# Patient Record
Sex: FEMALE | Race: WHITE | NOT HISPANIC OR LATINO | Employment: FULL TIME | URBAN - METROPOLITAN AREA
[De-identification: names, ages, dates, MRNs, and addresses within clinical notes are randomized per-mention and may not be internally consistent; named-entity substitution may affect disease eponyms.]

---

## 2022-09-06 ENCOUNTER — APPOINTMENT (OUTPATIENT)
Dept: MRI IMAGING | Facility: CLINIC | Age: 57
End: 2022-09-06
Attending: EMERGENCY MEDICINE
Payer: COMMERCIAL

## 2022-09-06 ENCOUNTER — HOSPITAL ENCOUNTER (EMERGENCY)
Facility: CLINIC | Age: 57
Discharge: HOME | End: 2022-09-06
Attending: EMERGENCY MEDICINE | Admitting: EMERGENCY MEDICINE
Payer: COMMERCIAL

## 2022-09-06 VITALS
OXYGEN SATURATION: 97 % | SYSTOLIC BLOOD PRESSURE: 141 MMHG | DIASTOLIC BLOOD PRESSURE: 96 MMHG | HEART RATE: 51 BPM | RESPIRATION RATE: 15 BRPM | TEMPERATURE: 98 F

## 2022-09-06 DIAGNOSIS — R51.9 NONINTRACTABLE HEADACHE, UNSPECIFIED CHRONICITY PATTERN, UNSPECIFIED HEADACHE TYPE: ICD-10-CM

## 2022-09-06 DIAGNOSIS — R42 DIZZINESS: ICD-10-CM

## 2022-09-06 LAB
ALBUMIN SERPL-MCNC: 4.1 G/DL (ref 3.4–5)
ALP SERPL-CCNC: 89 U/L (ref 40–150)
ALT SERPL W P-5'-P-CCNC: 35 U/L (ref 0–50)
ANION GAP SERPL CALCULATED.3IONS-SCNC: 6 MMOL/L (ref 3–14)
AST SERPL W P-5'-P-CCNC: 29 U/L (ref 0–45)
BILIRUB SERPL-MCNC: 1.1 MG/DL (ref 0.2–1.3)
BUN SERPL-MCNC: 18 MG/DL (ref 7–30)
CALCIUM SERPL-MCNC: 9.5 MG/DL (ref 8.5–10.1)
CHLORIDE BLD-SCNC: 106 MMOL/L (ref 94–109)
CO2 SERPL-SCNC: 28 MMOL/L (ref 20–32)
CREAT SERPL-MCNC: 0.83 MG/DL (ref 0.52–1.04)
ERYTHROCYTE [DISTWIDTH] IN BLOOD BY AUTOMATED COUNT: 12.9 % (ref 10–15)
GFR SERPL CREATININE-BSD FRML MDRD: 82 ML/MIN/1.73M2
GLUCOSE BLD-MCNC: 114 MG/DL (ref 70–99)
HCT VFR BLD AUTO: 40.8 % (ref 35–47)
HGB BLD-MCNC: 13.6 G/DL (ref 11.7–15.7)
HOLD SPECIMEN: NORMAL
MCH RBC QN AUTO: 29.1 PG (ref 26.5–33)
MCHC RBC AUTO-ENTMCNC: 33.3 G/DL (ref 31.5–36.5)
MCV RBC AUTO: 87 FL (ref 78–100)
PLATELET # BLD AUTO: 192 10E3/UL (ref 150–450)
POTASSIUM BLD-SCNC: 3.9 MMOL/L (ref 3.4–5.3)
PROT SERPL-MCNC: 7.6 G/DL (ref 6.8–8.8)
RBC # BLD AUTO: 4.67 10E6/UL (ref 3.8–5.2)
SODIUM SERPL-SCNC: 140 MMOL/L (ref 133–144)
TROPONIN I SERPL HS-MCNC: <3 NG/L
WBC # BLD AUTO: 5.5 10E3/UL (ref 4–11)

## 2022-09-06 PROCEDURE — A9585 GADOBUTROL INJECTION: HCPCS | Performed by: EMERGENCY MEDICINE

## 2022-09-06 PROCEDURE — 85027 COMPLETE CBC AUTOMATED: CPT | Performed by: EMERGENCY MEDICINE

## 2022-09-06 PROCEDURE — 258N000003 HC RX IP 258 OP 636: Performed by: EMERGENCY MEDICINE

## 2022-09-06 PROCEDURE — 80053 COMPREHEN METABOLIC PANEL: CPT | Performed by: EMERGENCY MEDICINE

## 2022-09-06 PROCEDURE — 85018 HEMOGLOBIN: CPT | Performed by: EMERGENCY MEDICINE

## 2022-09-06 PROCEDURE — 36415 COLL VENOUS BLD VENIPUNCTURE: CPT | Performed by: EMERGENCY MEDICINE

## 2022-09-06 PROCEDURE — 84484 ASSAY OF TROPONIN QUANT: CPT | Performed by: EMERGENCY MEDICINE

## 2022-09-06 PROCEDURE — 96361 HYDRATE IV INFUSION ADD-ON: CPT

## 2022-09-06 PROCEDURE — 96360 HYDRATION IV INFUSION INIT: CPT

## 2022-09-06 PROCEDURE — 82310 ASSAY OF CALCIUM: CPT | Performed by: EMERGENCY MEDICINE

## 2022-09-06 PROCEDURE — 255N000002 HC RX 255 OP 636: Performed by: EMERGENCY MEDICINE

## 2022-09-06 PROCEDURE — 99285 EMERGENCY DEPT VISIT HI MDM: CPT | Mod: 25

## 2022-09-06 PROCEDURE — 70549 MR ANGIOGRAPH NECK W/O&W/DYE: CPT

## 2022-09-06 PROCEDURE — 70544 MR ANGIOGRAPHY HEAD W/O DYE: CPT

## 2022-09-06 PROCEDURE — 93005 ELECTROCARDIOGRAM TRACING: CPT

## 2022-09-06 PROCEDURE — 70553 MRI BRAIN STEM W/O & W/DYE: CPT

## 2022-09-06 RX ORDER — GADOBUTROL 604.72 MG/ML
10 INJECTION INTRAVENOUS ONCE
Status: COMPLETED | OUTPATIENT
Start: 2022-09-06 | End: 2022-09-06

## 2022-09-06 RX ADMIN — SODIUM CHLORIDE 1000 ML: 9 INJECTION, SOLUTION INTRAVENOUS at 12:53

## 2022-09-06 RX ADMIN — GADOBUTROL 10 ML: 604.72 INJECTION INTRAVENOUS at 19:42

## 2022-09-06 ASSESSMENT — ACTIVITIES OF DAILY LIVING (ADL)
ADLS_ACUITY_SCORE: 35
ADLS_ACUITY_SCORE: 35

## 2022-09-06 NOTE — ED TRIAGE NOTES
Sudden onset of lightheadedness, headache and nausea. Pt reports right calf pain for weeks, no swelling noted to calf. Denies chest pain or SOB. Gait WNL, balance WNL, no focal neuro deficits on triage exam. No slurred speech or facial droop.     Triage Assessment     Row Name 09/06/22 1242       Triage Assessment (Adult)    Airway WDL WDL       Respiratory WDL    Respiratory WDL WDL       Skin Circulation/Temperature WDL    Skin Circulation/Temperature WDL WDL       Cardiac WDL    Cardiac WDL WDL       Peripheral/Neurovascular WDL    Peripheral Neurovascular WDL WDL       Cognitive/Neuro/Behavioral WDL    Cognitive/Neuro/Behavioral WDL X    Level of Consciousness alert    Arousal Level opens eyes spontaneously    Orientation oriented x 4    Speech clear;spontaneous;logical    Mood/Behavior calm;cooperative       Pupils (CN II)    Pupil PERRLA yes    Pupil Size Left 3 mm    Pupil Size Right 3 mm       [place]in Coma Scale    Best Eye Response 4-->(E4) spontaneous    Best Motor Response 6-->(M6) obeys commands    Best Verbal Response 5-->(V5) oriented    [place]in Coma Scale Score 15

## 2022-09-06 NOTE — ED PROVIDER NOTES
History     Chief Complaint:    Headache and Dizziness       HPI   Rakel Ashby is a 57 year old female who presents with concern of having developed headache and feeling of vertigo today.  This came on fairly quickly today.  No recent fall or trauma or neck injury.  No recent chiropractic adjustments.  Had a similar episode about 2 weeks ago that resolved fully.  Denies vision change or slurred speech or any speech difficulty.  No numbness or tingling.  No weakness or paralysis symptoms of the face or extremities.  Felt a bit queasy without emesis.  Neck feels tight.  No history of cerebrovascular disease.  No history of stroke.  No chest pain.  No dyspnea.  Denies any black or bloody stool.  Does not feel faint.  No new urinary symptoms.  Does not use anticoagulants.  No new medications.  Noted a blood pressure was elevated to a maximum of 140s over 90s.  Is not treated for hypertension.  Is treated for dyslipidemia.  No other concerns.    Allergies:  No Clinical Screening - See Comments  No Known Drug Allergies     Medications:    atorvastatin (LIPITOR) 20 MG tablet      Past Medical History:    Past Medical History:   Diagnosis Date     Allergic rhinitis, cause unspecified      Back pain      BPV (benign positional vertigo)      Bradycardia      Chest pain      Dizzy      Ear pain      Fatigue      GERD (gastroesophageal reflux disease)      Headache      Hot flashes      Hyperlipidemia with target LDL less than 160      Insomnia      Menorrhagia      Muscle soreness      Myalgia and myositis, unspecified      Night sweats      Orthostatic hypotension      Rhinitis, allergic      Tingling      Unspecified disorder of skin and subcutaneous tissue      Warts        Patient Active Problem List    Diagnosis Date Noted     Hyperlipidemia LDL goal <160 08/20/2015     Priority: Medium     GERD (gastroesophageal reflux disease)      Priority: Medium     Hyperlipidemia with target LDL less than 160 03/17/2015      Priority: Medium        Past Surgical History:    No past surgical history on file.     Family History:    family history includes Cancer (age of onset: 76) in her mother; Cancer (age of onset: 83) in her maternal grandfather; Cancer - colorectal in her maternal grandfather and mother; Cerebrovascular Disease in an other family member; Coronary Artery Disease in her father; Depression in an other family member; Hyperlipidemia in an other family member; Hypertension in an other family member.    Social History:   reports that she has never smoked. She has never used smokeless tobacco. She reports current alcohol use. She reports that she does not use drugs.    PCP: Aiden Green     Review of Systems  All systems otherwise negative or per HPI    Physical Exam     Patient Vitals for the past 24 hrs:   BP Temp Pulse Resp SpO2   09/06/22 2026 (!) 141/96 -- 51 -- 97 %   09/06/22 1610 (!) 147/93 -- 57 -- 99 %   09/06/22 1245 -- 98  F (36.7  C) -- -- --   09/06/22 1241 (!) 146/85 -- 59 15 98 %      Physical Exam  SKIN:  Warm, dry.  HEMATOLOGIC/IMMUNOLOGIC/LYMPHATIC:  No pallor.  No lower limb edema.  HENT: Painless active range of motion of head and neck.  EYES:  Conjunctivae normal.  Normal extraocular motion.  Pupils equal round and reactive to light.  Visual fields intact.  No nystagmus.  CARDIOVASCULAR:  Regular rate and rhythm.  No murmur.  No carotid bruit.  RESPIRATORY:  No respiratory distress, breath sounds equal and normal.  GASTROINTESTINAL:  Soft, nontender abdomen.  MUSCULOSKELETAL: Normal body habitus.  Full normal active range of motion of the upper and lower limbs.  NEUROLOGIC:  Alert, conversant.  Oriented to self place and time.  No aphasia.  No dysarthria.  No tactile sensory or motor deficit of face or extremities.  No limb ataxia.  PSYCHIATRIC:  Normal mood.    Emergency Department Course     ECG (12:52:54):  Rate 55 bpm.   QT/QTc 456/436.   P-R-T axes 14  42  41.   Interpreted at 17:51 by Horacio Ybarra  MD James.     Imaging:    MRA Neck (Carotids) wo & w Contrast   Final Result   IMPRESSION:   HEAD MRI:    1.  Normal head MRI.      HEAD MRA:    1.  No vascular occlusion.   2.  Incidental small bilateral paraclinoid aneurysms.      NECK MRA:   1.  Normal neck MRA.      MRA Brain ([facility] [place]) wo Contrast   Final Result   IMPRESSION:   HEAD MRI:    1.  Normal head MRI.      HEAD MRA:    1.  No vascular occlusion.   2.  Incidental small bilateral paraclinoid aneurysms.      NECK MRA:   1.  Normal neck MRA.      MR Brain w/o & w Contrast   Final Result   IMPRESSION:   HEAD MRI:    1.  Normal head MRI.      HEAD MRA:    1.  No vascular occlusion.   2.  Incidental small bilateral paraclinoid aneurysms.      NECK MRA:   1.  Normal neck MRA.           Laboratory:    Labs Ordered and Resulted from Time of ED Arrival to Time of ED Departure   COMPREHENSIVE METABOLIC PANEL - Abnormal       Result Value    Sodium 140      Potassium 3.9      Chloride 106      Carbon Dioxide (CO2) 28      Anion Gap 6      Urea Nitrogen 18      Creatinine 0.83      Calcium 9.5      Glucose 114 (*)     Alkaline Phosphatase 89      AST 29      ALT 35      Protein Total 7.6      Albumin 4.1      Bilirubin Total 1.1      GFR Estimate 82     CBC WITH PLATELETS - Normal    WBC Count 5.5      RBC Count 4.67      Hemoglobin 13.6      Hematocrit 40.8      MCV 87      MCH 29.1      MCHC 33.3      RDW 12.9      Platelet Count 192     TROPONIN I - Normal    Troponin I High Sensitivity <3        Interventions:    Medications   0.9% sodium chloride BOLUS (0 mLs Intravenous Stopped 9/6/22 1512)   gadobutrol (GADAVIST) injection 10 mL (10 mLs Intravenous Given 9/6/22 1942)        Emergency Department Course:  Past medical records, nursing notes, and vitals reviewed.  I performed an exam of the patient and obtained history, as documented above.  I rechecked the patient. Findings and plan explained to the patient and her . Patient was  discharged.    Impression & Plan      Medical Decision Making:  This patient presents with headache and dizziness and some degree of neck tightness.  Considered a host of etiology but most concerned for neoplastic disease and/or stroke.  Underwent the above evaluation which was largely reassuring.  Very small bilateral intracranial aneurysms which I think are unrelated to the patient's symptoms.  I think the patient can safely be discharged to follow-up with primary care to recheck regarding her symptoms.    Diagnosis:    ICD-10-CM    1. Dizziness  R42    2. Nonintractable headache, unspecified chronicity pattern, unspecified headache type  R51.9         Discharge Medications:  Discharge Medication List as of 9/6/2022  8:24 PM           9/6/2022   Horacio Ybarra MD Moe, James Thomas, MD  09/06/22 2052

## 2022-09-07 LAB
ATRIAL RATE - MUSE: 55 BPM
DIASTOLIC BLOOD PRESSURE - MUSE: NORMAL MMHG
INTERPRETATION ECG - MUSE: NORMAL
P AXIS - MUSE: 14 DEGREES
PR INTERVAL - MUSE: 128 MS
QRS DURATION - MUSE: 84 MS
QT - MUSE: 456 MS
QTC - MUSE: 436 MS
R AXIS - MUSE: 42 DEGREES
SYSTOLIC BLOOD PRESSURE - MUSE: NORMAL MMHG
T AXIS - MUSE: 41 DEGREES
VENTRICULAR RATE- MUSE: 55 BPM